# Patient Record
Sex: FEMALE | URBAN - METROPOLITAN AREA
[De-identification: names, ages, dates, MRNs, and addresses within clinical notes are randomized per-mention and may not be internally consistent; named-entity substitution may affect disease eponyms.]

---

## 2023-06-29 ENCOUNTER — APPOINTMENT (OUTPATIENT)
Dept: URBAN - METROPOLITAN AREA CLINIC 205 | Age: 23
Setting detail: DERMATOLOGY
End: 2023-07-03

## 2023-06-29 DIAGNOSIS — Z79.899 OTHER LONG TERM (CURRENT) DRUG THERAPY: ICD-10-CM

## 2023-06-29 DIAGNOSIS — L01.01 NON-BULLOUS IMPETIGO: ICD-10-CM

## 2023-06-29 PROCEDURE — OTHER PRESCRIPTION: OTHER

## 2023-06-29 PROCEDURE — OTHER COUNSELING: OTHER

## 2023-06-29 PROCEDURE — 96372 THER/PROPH/DIAG INJ SC/IM: CPT

## 2023-06-29 PROCEDURE — OTHER ORDER TESTS: OTHER

## 2023-06-29 PROCEDURE — OTHER HIGH RISK MEDICATION MONITORING: OTHER

## 2023-06-29 PROCEDURE — 99203 OFFICE O/P NEW LOW 30 MIN: CPT

## 2023-06-29 PROCEDURE — OTHER TREATMENT REGIMEN: OTHER

## 2023-06-29 PROCEDURE — OTHER INTRAMUSCULAR KENALOG: OTHER

## 2023-06-29 RX ORDER — DOXYCYCLINE HYCLATE 100 MG/1
CAPSULE, GELATIN COATED ORAL
Qty: 28 | Refills: 0 | Status: ERX | COMMUNITY
Start: 2023-06-29

## 2023-06-29 ASSESSMENT — LOCATION DETAILED DESCRIPTION DERM
LOCATION DETAILED: RIGHT BUTTOCK
LOCATION DETAILED: PHILTRUM

## 2023-06-29 ASSESSMENT — LOCATION ZONE DERM
LOCATION ZONE: LIP
LOCATION ZONE: TRUNK

## 2023-06-29 ASSESSMENT — LOCATION SIMPLE DESCRIPTION DERM
LOCATION SIMPLE: RIGHT BUTTOCK
LOCATION SIMPLE: UPPER LIP

## 2023-06-29 NOTE — PROCEDURE: HIGH RISK MEDICATION MONITORING
No vertebral tenderness/No kyphosis/No scoliosis/No lordosis/No torticollis/No arthropathy Hydroxyzine Counseling: Patient advised that the medication is sedating and not to drive a car after taking this medication.  Patient informed of potential adverse effects including but not limited to dry mouth, urinary retention, and blurry vision.  The patient verbalized understanding of the proper use and possible adverse effects of hydroxyzine.  All of the patient's questions and concerns were addressed.

## 2023-06-29 NOTE — PROCEDURE: HIGH RISK MEDICATION MONITORING
VOMITING Mirvaso Counseling: Mirvaso is a topical medication which can decrease superficial blood flow where applied. Side effects are uncommon and include stinging, redness and allergic reactions.

## 2023-06-29 NOTE — PROCEDURE: TREATMENT REGIMEN
Detail Level: Simple
Discontinue Regimen: Cephlexin po, erythromycin gel, and prednisone tablets.  She had already stopped all of this anyway.
Initiate Treatment: Doxycycline 100mg bid, vanicream ointment as often as needed
Plan: I discussed risks benefits and alternatives to IM kenalog.  She denies diabetes, hypertension, or glaucoma.  She hasn't had a systemic steroid within 6 months except a short course of oral steroids that she didn't finish for this issue as well.  She didn't feel the steroid were helping at the time but I don't think she was on them long enough. She agreed to IM kenalog. She mentioned she was going to be seeing an allergist to rule out food allergies.  I told her she will need to wait at least one month after this steroid to pursue any allergy testing as it will alter her results. I discussed the information with her and her dad who she called on the phone to include in the conversation.  I answered questions to their satisfaction. The agreed to the plan.

## 2023-07-13 ENCOUNTER — APPOINTMENT (OUTPATIENT)
Dept: URBAN - METROPOLITAN AREA CLINIC 205 | Age: 23
Setting detail: DERMATOLOGY
End: 2023-07-13

## 2023-07-13 DIAGNOSIS — Z79.899 OTHER LONG TERM (CURRENT) DRUG THERAPY: ICD-10-CM

## 2023-07-13 DIAGNOSIS — L01.01 NON-BULLOUS IMPETIGO: ICD-10-CM

## 2023-07-13 PROCEDURE — OTHER TREATMENT REGIMEN: OTHER

## 2023-07-13 PROCEDURE — 99213 OFFICE O/P EST LOW 20 MIN: CPT

## 2023-07-13 PROCEDURE — OTHER COUNSELING: OTHER

## 2023-07-13 PROCEDURE — OTHER HIGH RISK MEDICATION MONITORING: OTHER

## 2023-07-13 NOTE — PROCEDURE: TREATMENT REGIMEN
Continue Regimen: Doxycycline 100mg bid until finished, vanicream ointment as often as needed
Detail Level: Simple

## 2023-07-13 NOTE — PROCEDURE: HIGH RISK MEDICATION MONITORING
History/Exam/Medical Decision Making Birth Control Pills Pregnancy And Lactation Text: This medication should be avoided if pregnant and for the first 30 days post-partum.

## 2023-09-07 ENCOUNTER — APPOINTMENT (OUTPATIENT)
Dept: URBAN - METROPOLITAN AREA CLINIC 205 | Age: 23
Setting detail: DERMATOLOGY
End: 2023-09-07

## 2023-09-07 DIAGNOSIS — Z79.899 OTHER LONG TERM (CURRENT) DRUG THERAPY: ICD-10-CM

## 2023-09-07 DIAGNOSIS — L01.01 NON-BULLOUS IMPETIGO: ICD-10-CM

## 2023-09-07 PROBLEM — L08.9 LOCAL INFECTION OF THE SKIN AND SUBCUTANEOUS TISSUE, UNSPECIFIED: Status: ACTIVE | Noted: 2023-09-07

## 2023-09-07 PROCEDURE — 99213 OFFICE O/P EST LOW 20 MIN: CPT

## 2023-09-07 PROCEDURE — OTHER PRESCRIPTION: OTHER

## 2023-09-07 PROCEDURE — OTHER HIGH RISK MEDICATION MONITORING: OTHER

## 2023-09-07 PROCEDURE — OTHER COUNSELING: OTHER

## 2023-09-07 PROCEDURE — OTHER TREATMENT REGIMEN: OTHER

## 2023-09-07 RX ORDER — DOXYCYCLINE HYCLATE 100 MG/1
CAPSULE, GELATIN COATED ORAL
Qty: 28 | Refills: 0 | Status: ERX

## 2023-09-07 NOTE — PROCEDURE: TREATMENT REGIMEN
Continue Regimen: Vanicream ointment as often as needed
Detail Level: Simple
Initiate Treatment: Restart Doxycycline 100mg bid
Plan: Her bacterial culture was negative at her initial visit on 6/29/23 for impetigo.  She was clear at follow up on 7/13/23 with doxycycline and IM kenalog.  She used a lip balm accidentally that she used prior to the diagnosis and since that time she has noticed yellow crust coming and going again.  Since her bacterial culture was clear it is very possible this is an allergic contact dermatitis.  I recommend patch testing.  She is already scheduled to see an allergist on Sept 13, 2023 as she is concerned about other allergies.  I encouraged her to keep that appointment.  I recommended the doxycycline just as a precaution since she did reuse a contaminated lip balm.  She is eliminating all contaminated products now.

## 2023-10-25 NOTE — PROCEDURE: HIGH RISK MEDICATION MONITORING
Detail Level: Detailed Wound Assessment Override (Optional): excessive granulation tissue Wound Evaluated By: Dr. Atkins Wound Assessment Override (Optional): healed incision line Wound Evaluated By: Dr. Atkins Zyclara Counseling:  I discussed with the patient the risks of imiquimod including but not limited to erythema, scaling, itching, weeping, crusting, and pain.  Patient understands that the inflammatory response to imiquimod is variable from person to person and was educated regarded proper titration schedule.  If flu-like symptoms develop, patient knows to discontinue the medication and contact us.
